# Patient Record
Sex: MALE | Race: OTHER | HISPANIC OR LATINO | URBAN - METROPOLITAN AREA
[De-identification: names, ages, dates, MRNs, and addresses within clinical notes are randomized per-mention and may not be internally consistent; named-entity substitution may affect disease eponyms.]

---

## 2018-08-29 PROBLEM — E66.01 SEVERE OBESITY (BMI 35.0-39.9): Status: ACTIVE | Noted: 2018-08-29

## 2019-04-11 PROBLEM — Z71.3 DIETARY COUNSELING: Status: ACTIVE | Noted: 2019-04-11

## 2019-04-11 PROBLEM — Z22.7 LATENT TUBERCULOSIS INFECTION: Status: ACTIVE | Noted: 2019-04-11

## 2019-04-11 PROBLEM — G47.33 OBSTRUCTIVE SLEEP APNEA SYNDROME: Status: ACTIVE | Noted: 2019-04-11

## 2019-04-11 PROBLEM — E78.5 HYPERLIPIDEMIA: Status: ACTIVE | Noted: 2019-04-11

## 2019-04-11 PROBLEM — R73.03 PREDIABETES: Status: ACTIVE | Noted: 2019-04-11

## 2019-05-07 PROBLEM — R73.9 ELEVATED BLOOD SUGAR: Status: ACTIVE | Noted: 2019-05-07

## 2019-05-07 PROBLEM — Z23 ENCOUNTER FOR IMMUNIZATION: Status: ACTIVE | Noted: 2019-05-07

## 2019-05-07 PROBLEM — Z00.00 PHYSICAL EXAM: Status: ACTIVE | Noted: 2019-05-07

## 2019-05-29 ENCOUNTER — HOSPITAL ENCOUNTER (EMERGENCY)
Age: 39
Discharge: HOME | End: 2019-05-29
Attending: EMERGENCY MEDICINE
Payer: SELF-PAY

## 2019-05-29 ENCOUNTER — APPOINTMENT (OUTPATIENT)
Dept: GENERAL RADIOLOGY | Age: 39
End: 2019-05-29
Attending: EMERGENCY MEDICINE
Payer: SELF-PAY

## 2019-05-29 VITALS
BODY MASS INDEX: 41.57 KG/M2 | HEART RATE: 77 BPM | SYSTOLIC BLOOD PRESSURE: 120 MMHG | DIASTOLIC BLOOD PRESSURE: 73 MMHG | OXYGEN SATURATION: 95 % | HEIGHT: 70 IN | WEIGHT: 290.4 LBS | RESPIRATION RATE: 18 BRPM | TEMPERATURE: 97.9 F

## 2019-05-29 DIAGNOSIS — S39.012A BACK STRAIN, INITIAL ENCOUNTER: ICD-10-CM

## 2019-05-29 DIAGNOSIS — R06.00 DYSPNEA, UNSPECIFIED TYPE: Primary | ICD-10-CM

## 2019-05-29 PROCEDURE — 77030013140 HC MSK NEB VYRM -A

## 2019-05-29 PROCEDURE — 74011636637 HC RX REV CODE- 636/637: Performed by: EMERGENCY MEDICINE

## 2019-05-29 PROCEDURE — 94640 AIRWAY INHALATION TREATMENT: CPT

## 2019-05-29 PROCEDURE — 71046 X-RAY EXAM CHEST 2 VIEWS: CPT

## 2019-05-29 PROCEDURE — 99283 EMERGENCY DEPT VISIT LOW MDM: CPT | Performed by: EMERGENCY MEDICINE

## 2019-05-29 PROCEDURE — 74011000250 HC RX REV CODE- 250: Performed by: EMERGENCY MEDICINE

## 2019-05-29 PROCEDURE — 94664 DEMO&/EVAL PT USE INHALER: CPT

## 2019-05-29 RX ORDER — PREDNISONE 20 MG/1
40 TABLET ORAL DAILY
Qty: 10 TAB | Refills: 0 | Status: SHIPPED | OUTPATIENT
Start: 2019-05-29 | End: 2019-06-03

## 2019-05-29 RX ORDER — IPRATROPIUM BROMIDE AND ALBUTEROL SULFATE 2.5; .5 MG/3ML; MG/3ML
3 SOLUTION RESPIRATORY (INHALATION)
Status: COMPLETED | OUTPATIENT
Start: 2019-05-29 | End: 2019-05-29

## 2019-05-29 RX ORDER — ALBUTEROL SULFATE 90 UG/1
2 AEROSOL, METERED RESPIRATORY (INHALATION)
Qty: 1 INHALER | Refills: 0 | Status: SHIPPED | OUTPATIENT
Start: 2019-05-29

## 2019-05-29 RX ORDER — PREDNISONE 20 MG/1
60 TABLET ORAL
Status: COMPLETED | OUTPATIENT
Start: 2019-05-29 | End: 2019-05-29

## 2019-05-29 RX ORDER — METHOCARBAMOL 750 MG/1
1500 TABLET, FILM COATED ORAL 4 TIMES DAILY
Qty: 40 TAB | Refills: 0 | Status: SHIPPED | OUTPATIENT
Start: 2019-05-29

## 2019-05-29 RX ADMIN — PREDNISONE 60 MG: 20 TABLET ORAL at 06:42

## 2019-05-29 RX ADMIN — IPRATROPIUM BROMIDE AND ALBUTEROL SULFATE 3 ML: .5; 3 SOLUTION RESPIRATORY (INHALATION) at 06:24

## 2019-05-29 NOTE — ED PROVIDER NOTES
28-year-old male presents with an episode of dyspnea. States it woke him from sleep. Previous smoker who quit about 3 months ago  He denies any recent fever  History of wheezing or shortness of breath    No ill contacts. No recent illnesses    The history is provided by the patient. Shortness of Breath   This is a new problem. The problem occurs rarely. The current episode started 1 to 2 hours ago. The problem has not changed since onset. Associated symptoms include wheezing. Pertinent negatives include no fever, no headaches, no rhinorrhea, no sore throat, no neck pain, no cough, no sputum production, no chest pain, no vomiting, no abdominal pain, no rash and no leg swelling. It is unknown what precipitated the problem. Risk factors include smoking. He has tried nothing for the symptoms. He has had no prior hospitalizations. Associated medical issues do not include asthma, COPD or pneumonia. No past medical history on file. No past surgical history on file. No family history on file.     Social History     Socioeconomic History    Marital status:      Spouse name: Not on file    Number of children: Not on file    Years of education: Not on file    Highest education level: Not on file   Occupational History    Not on file   Social Needs    Financial resource strain: Not on file    Food insecurity:     Worry: Not on file     Inability: Not on file    Transportation needs:     Medical: Not on file     Non-medical: Not on file   Tobacco Use    Smoking status: Not on file   Substance and Sexual Activity    Alcohol use: Not on file    Drug use: Not on file    Sexual activity: Not on file   Lifestyle    Physical activity:     Days per week: Not on file     Minutes per session: Not on file    Stress: Not on file   Relationships    Social connections:     Talks on phone: Not on file     Gets together: Not on file     Attends Episcopal service: Not on file     Active member of club or organization: Not on file     Attends meetings of clubs or organizations: Not on file     Relationship status: Not on file    Intimate partner violence:     Fear of current or ex partner: Not on file     Emotionally abused: Not on file     Physically abused: Not on file     Forced sexual activity: Not on file   Other Topics Concern    Not on file   Social History Narrative    Not on file         ALLERGIES: Patient has no known allergies. Review of Systems   Constitutional: Negative for activity change, chills, diaphoresis and fever. HENT: Negative for dental problem, hearing loss, nosebleeds, rhinorrhea and sore throat. Eyes: Negative for pain, discharge, redness and visual disturbance. Respiratory: Positive for shortness of breath and wheezing. Negative for cough, sputum production and chest tightness. Cardiovascular: Negative for chest pain, palpitations and leg swelling. Gastrointestinal: Negative for abdominal pain, constipation, diarrhea, nausea and vomiting. Endocrine: Negative for cold intolerance, heat intolerance, polydipsia and polyuria. Genitourinary: Negative for dysuria and flank pain. Musculoskeletal: Negative for arthralgias, back pain, joint swelling, myalgias and neck pain. Skin: Negative for pallor and rash. Allergic/Immunologic: Negative for environmental allergies and food allergies. Neurological: Negative for dizziness, tremors, light-headedness, numbness and headaches. Hematological: Negative for adenopathy. Does not bruise/bleed easily. Psychiatric/Behavioral: Negative for confusion and dysphoric mood. The patient is not nervous/anxious and is not hyperactive. All other systems reviewed and are negative. Vitals:    05/29/19 0612   BP: (!) 149/98   Pulse: 74   Resp: 20   Temp: (!) 76 °F (24.4 °C)   SpO2: 93%   Weight: 131.7 kg (290 lb 6.4 oz)   Height: 5' 10\" (1.778 m)            Physical Exam   Constitutional: He is oriented to person, place, and time. He appears well-developed and well-nourished. He appears distressed. HENT:   Head: Normocephalic and atraumatic. Right Ear: External ear normal.   Left Ear: External ear normal.   Mouth/Throat: Oropharynx is clear and moist. No oropharyngeal exudate. Eyes: Pupils are equal, round, and reactive to light. Conjunctivae and EOM are normal. No scleral icterus. Neck: Normal range of motion. Neck supple. No JVD present. No thyromegaly present. Cardiovascular: Normal rate, regular rhythm, normal heart sounds and intact distal pulses. Exam reveals no gallop and no friction rub. No murmur heard. Pulmonary/Chest: Effort normal. No respiratory distress. He has decreased breath sounds. He has no wheezes. He has no rales. Abdominal: Soft. Bowel sounds are normal. He exhibits no distension. There is no hepatosplenomegaly. There is no tenderness. Musculoskeletal: Normal range of motion. He exhibits no edema, tenderness or deformity. Back:    Neurological: He is alert and oriented to person, place, and time. No cranial nerve deficit or sensory deficit. He exhibits normal muscle tone. Coordination normal.   Skin: Skin is warm and dry. Capillary refill takes less than 2 seconds. No rash noted. Psychiatric: He has a normal mood and affect. His behavior is normal. Judgment and thought content normal.   Nursing note and vitals reviewed.        MDM  Number of Diagnoses or Management Options  Back strain, initial encounter: new and does not require workup  Dyspnea, unspecified type: new and requires workup  Diagnosis management comments: Chest x-ray reveals no acute cardiopulmonary disease  The patient is resting comfortably with a room air saturation of 95%    Patient will be discharged       Amount and/or Complexity of Data Reviewed  Tests in the radiology section of CPT®: ordered and reviewed  Tests in the medicine section of CPT®: ordered and reviewed  Review and summarize past medical records: yes    Risk of Complications, Morbidity, and/or Mortality  Presenting problems: moderate  Diagnostic procedures: moderate  Management options: moderate  General comments: Elements of this note have been dictated via voice recognition software. Text and phrases may be limited by the accuracy of the software. The chart has been reviewed, but errors may still be present.       Patient Progress  Patient progress: improved         Procedures

## 2019-05-29 NOTE — ED NOTES
I have reviewed discharge instructions with the patient. The patient verbalized understanding. Patient left ED via Discharge Method: ambulatory to Home with self. Opportunity for questions and clarification provided. Patient given 3 scripts. Darrin Rivas RN    To continue your aftercare when you leave the hospital, you may receive an automated call from our care team to check in on how you are doing. This is a free service and part of our promise to provide the best care and service to meet your aftercare needs.  If you have questions, or wish to unsubscribe from this service please call [phone]. Thank you for Choosing our [facility] Emergency Department.

## 2019-05-29 NOTE — DISCHARGE INSTRUCTIONS
Take medications as prescribed  Call your doctor/follow up doctor to set up appointment for recheck visit  Return to ER for any worsening symptoms or new problems which may arise

## 2019-07-30 ENCOUNTER — HOSPITAL ENCOUNTER (EMERGENCY)
Age: 39
Discharge: HOME | End: 2019-07-30
Attending: EMERGENCY MEDICINE
Payer: SELF-PAY

## 2019-07-30 VITALS
RESPIRATION RATE: 16 BRPM | WEIGHT: 294 LBS | OXYGEN SATURATION: 99 % | SYSTOLIC BLOOD PRESSURE: 135 MMHG | DIASTOLIC BLOOD PRESSURE: 80 MMHG | HEART RATE: 80 BPM | HEIGHT: 71 IN | TEMPERATURE: 98 F | BODY MASS INDEX: 41.16 KG/M2

## 2019-07-30 DIAGNOSIS — M54.6 ACUTE LEFT-SIDED THORACIC BACK PAIN: Primary | ICD-10-CM

## 2019-07-30 PROCEDURE — 99283 EMERGENCY DEPT VISIT LOW MDM: CPT | Performed by: EMERGENCY MEDICINE

## 2019-07-30 RX ORDER — DICLOFENAC POTASSIUM 50 MG/1
50 TABLET, FILM COATED ORAL 3 TIMES DAILY
Qty: 15 TAB | Refills: 0 | Status: SHIPPED | OUTPATIENT
Start: 2019-07-30

## 2019-07-30 RX ORDER — CYCLOBENZAPRINE HCL 5 MG
10 TABLET ORAL
Qty: 20 TAB | Refills: 0 | Status: SHIPPED | OUTPATIENT
Start: 2019-07-30

## 2019-07-31 NOTE — ED PROVIDER NOTES
Patient with left thoracic back pain. Worse with bending and twisting. 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 No saddle anesthesia or change in bowel or bladder function. No previous similar episodes. The history is provided by the patient. A  was used. Back Pain    This is a new problem. The current episode started more than 2 days ago. The problem has not changed since onset. The problem occurs constantly. Patient reports work related injury. 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 The pain is present in the thoracic spine and left side. The quality of the pain is described as aching and sharp. The pain does not radiate. The pain is moderate. The symptoms are aggravated by bending and twisting. Pertinent negatives include no chest pain, no fever, no numbness, no headaches, no abdominal pain, no abdominal swelling, no bowel incontinence, no perianal numbness, no bladder incontinence, no dysuria, no leg pain, no paresthesias and no weakness. The treatment provided no relief. No past medical history on file. Past Surgical History:   Procedure Laterality Date    HX ADENOIDECTOMY Bilateral     age - baby    HX TONSILLECTOMY Bilateral     age - baby         No family history on file. Social History     Socioeconomic History    Marital status:      Spouse name: Not on file    Number of children: Not on file    Years of education: Not on file    Highest education level: Not on file   Occupational History    Not on file   Social Needs    Financial resource strain: Not on file    Food insecurity:     Worry: Not on file     Inability: Not on file    Transportation needs:     Medical: Not on file     Non-medical: Not on file   Tobacco Use    Smoking status: Current Every Day Smoker     Packs/day: 0.25    Smokeless tobacco: Never Used    Tobacco comment: would like treatment -    Substance and Sexual Activity    Alcohol use:  Yes Comment: occasional    Drug use: No    Sexual activity: Yes     Partners: Female   Lifestyle    Physical activity:     Days per week: Not on file     Minutes per session: Not on file    Stress: Not on file   Relationships    Social connections:     Talks on phone: Not on file     Gets together: Not on file     Attends Voodoo service: Not on file     Active member of club or organization: Not on file     Attends meetings of clubs or organizations: Not on file     Relationship status: Not on file    Intimate partner violence:     Fear of current or ex partner: Not on file     Emotionally abused: Not on file     Physically abused: Not on file     Forced sexual activity: Not on file   Other Topics Concern    Not on file   Social History Narrative    Not on file         ALLERGIES: Aspirin    Review of Systems   Constitutional: Negative for chills and fever. Eyes: Negative for pain and redness. Respiratory: Negative for chest tightness, shortness of breath and wheezing. Cardiovascular: Negative for chest pain and leg swelling. Gastrointestinal: Negative for abdominal pain, bowel incontinence, diarrhea, nausea and vomiting. Genitourinary: Negative for bladder incontinence, dysuria and hematuria. Musculoskeletal: Positive for back pain. Negative for gait problem, neck pain and neck stiffness. Skin: Negative for color change and rash. Neurological: Negative for weakness, numbness, headaches and paresthesias. Vitals:    07/30/19 2222   BP: 139/83   Pulse: 84   Resp: 20   Temp: 98 °F (36.7 °C)   SpO2: 96%   Weight: 133.4 kg (294 lb)   Height: 5' 10.5\" (1.791 m)            Physical Exam   Constitutional: He is oriented to person, place, and time. He appears well-developed and well-nourished. HENT:   Head: Normocephalic and atraumatic. Cardiovascular: Normal rate and regular rhythm. Pulmonary/Chest: Effort normal and breath sounds normal.   Abdominal: Soft.  Bowel sounds are normal. There is no tenderness. Musculoskeletal: Normal range of motion. He exhibits tenderness (left mid back. no midline TTP. Youlanda Saris ). He exhibits no edema. Neurological: He is alert and oriented to person, place, and time. Skin: Skin is warm and dry. MDM  Number of Diagnoses or Management Options  Diagnosis management comments: We'll treat patient's back pain as muscular with medication at home.     Patient Progress  Patient progress: stable         Procedures

## 2019-07-31 NOTE — ED NOTES
I have reviewed discharge instructions with the patient. The patient verbalized understanding. Patient left ED via Discharge Method: ambulatory to Home with self. Opportunity for questions and clarification provided. Patient given 2 scripts. To continue your aftercare when you leave the hospital, you may receive an automated call from our care team to check in on how you are doing. This is a free service and part of our promise to provide the best care and service to meet your aftercare needs.  If you have questions, or wish to unsubscribe from this service please call [phone]. Thank you for Choosing our [facility] Emergency Department.

## 2019-07-31 NOTE — DISCHARGE INSTRUCTIONS
Patient Education        Dolor de espalda: Instrucciones de cuidado - [ Back Pain: Care Instructions ]  Instrucciones de cuidado    El dolor de espalda tiene muchas causas posibles. Con frecuencia, está relacionado con problemas en los músculos y ligamentos de la espalda. También podría estar relacionado con problemas de los nervios, discos o huesos de la espalda. Moverse, levantar algo, ponerse de pie, sentarse o dormir de [place] incómoda pueden forzar la espalda. Algunas veces no se da cuenta de que tiene david lesión [employer] tarde. La artritis es otra causa común del dolor de espalda. Aunque galo vez duela mucho, el dolor de espalda por lo general mejora por sí mismo en varias semanas. [address] personas se recuperan en 12 semanas o menos. Hacer un buen tratamiento en el hogar y tener cuidado de no forzar la espalda puede ayudar a que se sienta mejor más pronto. La atención de seguimiento es david parte clave de goss tratamiento y seguridad. Asegúrese de hacer y acudir a todas las citas, y llame a goss médico si está teniendo problemas. También es david buena idea saber los resultados de alison exámenes y mantener david lista de los medicamentos que riley. ¿Cómo puede cuidarse en el hogar? · Siéntese o acuéstese en las posiciones más cómodas y que reduzcan el dolor. Trate david de estas posiciones al [place]:  ? Acuéstese boca arriba con las rodillas dobladas y apoyadas sobre [address] [place]. ? Acuéstese en el piso con las piernas sobre el asiento de un sofá o david silla. ? Acuéstese de lado con las rodillas y caderas dobladas y [employer] almohada entre las piernas. ? Acuéstese boca abajo siempre que esta posición no empeore el dolor. · No se siente en la cama y evite los sofás blandos y las posiciones torcidas. El reposo en cama puede aliviar el dolor al principio, pérez retrasa la sanación. Evite reposar en la cama después del primer día de dolor de espalda. · Cambie de posición cada 30 minutos.  Si debe sentarse por [employer], párese y descanse de estar sentado. Levántese y camine, o acuéstese en david posición cómoda. · Pruebe usar david almohadilla térmica a temperatura baja o mediana rudi 15 a 20 minutos cada 2 ó 3 horas. Pruebe david ducha tibia en vez de david sesión con la almohadilla térmica. · También puede probar david compresa de hielo rudi 10 a 15 minutos cada 2 a 3 horas. Póngase un paño dai entre la compresa de hielo y la piel. · [employer] analgésicos (medicamentos para el dolor) exactamente según las indicaciones. ? Si el médico le recetó un analgésico, tómelo según las indicaciones. ? Si no está tomando un analgésico recetado, pregúntele a goss médico si puede cherry anuja de [employer]. · Luis F caminatas cortas varias veces al día. Usted puede comenzar con 5 a 10 minutos, 3 ó 4 veces al día, y aumentar progresivamente hasta lograr caminatas más largas. Camine en superficies megan y evite colinas y escaleras hasta que la espalda esté mejor. · Regrese al [place] y otras actividades lo más pronto posible. El descanso continuo sin actividad por lo general no es murry para la espalda. · Para prevenir el dolor de espalda en el futuro, luis f ejercicios para estirar y fortalecer la espalda y el abdomen. Aprenda a [place], técnicas seguras para levantar peso y la mecánica corporal apropiada. ¿Cuándo debe pedir ayuda? Llame a goss médico ahora mismo o busque atención médica inmediata si:    · Tiene un entumecimiento nuevo o peor en las piernas.     · Tiene debilidad nueva o peor en las piernas. (Preemption puede hacer que le resulte difícil ponerse de pie).   · Pierde el control de la vejiga o los intestinos.    Preste especial atención a los cambios en goss caitlin y asegúrese de comunicarse con goss médico si:    · Tiene fiebre, pierde peso o no se siente blaire.     · No mejora jaime se esperaba. ¿Dónde puede encontrar más información en inglés?   Zita Skinner a http://fadumo-monico.info/. Rianna Walker [id number] en la búsqueda para aprender David Plater de \"Dolor de espalda: Instrucciones de cuidado - [ Back Pain: Care Instructions ]. \"  Revisado: 20 septiembre, 2018  Versión del contenido: 12.1  © 1447-4524 Healthwise, Incorporated. Las instrucciones de cuidado fueron adaptadas bajo licencia por Good Help Connections (which disclaims liability or warranty for this information). Si usted tiene [place] afección médica o sobre estas instrucciones, siempre pregunte a goss profesional de caitlin. Healthwise, Incorporated niega toda garantía o responsabilidad por goss uso de esta información. Patient Education        Guillermo Martini del dolor de espalda - [ Delores Jose Maria About Relief for Back Pain ]  ¿Qué son la tensión y la distensión en la espalda? La distensión en la espalda ocurre cuando usted estira [place], o fuerza, un músculo de la espalda. Usted puede lesionarse la espalda en un accidente o cuando hace ejercicio o levanta algo. La mayoría de los betina de espalda mejoran con reposo y con el tiempo. Usted puede cuidarse en el hogar para ayudar a que goss espalda sane. ¿Qué es lo josué que puede hacer para aliviar el dolor de espalda? Cuando empiece a sentir dolor de espalda, siga estos pasos:  · Camine. Dé un paseo corto (10 a 20 minutos) sobre david superficie plana (sin pendientes, donde no haya colinas o escaleras) cada 2 o 3 horas. Solo camine distancias que pueda manejar sin dolor, especialmente dolor en las piernas. · Relájese. Encuentre david posición cómoda para descansar. Algunas personas se sienten cómodas en el suelo o en david cama de firmeza media con david almohada pequeña debajo de la osman y otra debajo de las rodillas. Otras prefieren acostarse de lado con david almohada entre las rodillas. No permanezca en david posición por [employer]. · Pruebe con calor o hielo.  Trate de utilizar david almohadilla térmica a baja o media temperatura, o tome david ducha tibia de 15 o 20 minutos cada 2 o 3 horas. O puede comprar vendas térmicas desechables que se usan david lucila vez por hasta 8 horas. También puede probar compresas de hielo entre 10 y 15 minutos cada 2 o 3 horas. Puede usar david compresa de hielo o david bolsa de verduras congeladas envuelta en david toalla delgada. No existe evidencia sólida de que el calor o el hielo ayuden, pérez puede probarlos para fani si son de [facility]. También podría convenirle probar alternar [employer] frío y el calor. · [employer] analgésicos (medicamentos para el dolor) exactamente según las indicaciones. ? Si el médico le recetó un analgésico, tómelo según las indicaciones. ? Si no está tomando un analgésico recetado, pregúntele a goss médico si puede cherry anuja de [employer]. ¿Qué más puede hacer? · Maryelizabeth Luster estiramientos y ejercicio. Los ejercicios que incrementan la flexibilidad pueden aliviar el dolor y facilitar que los músculos mantengan a la columna vertebral en david buena posición neutra. Y no se olvide de seguir caminando. · Hágase masajes usted mismo. Usted puede darse masaje para relajarse después del trabajo o de la escuela o para sentirse lleno de energía en la mañana. No es difícil [employer], las skip o el candida. El darse masaje usted mismo funciona mejor si está con ropa cómoda y sentado o [place] en david posición cómoda. Utilice aceite o loción para masajearse la piel directamente. · Reduzca el estrés. El dolor de espalda puede llevar a un círculo corey: La angustia por el dolor tensa los músculos en la espalda, lo que a goss vez causa más dolor. Aprenda a relajar la mente y los músculos para disminuir el estrés. ¿Dónde puede encontrar más información en inglés? López Denise a http://fadumo-monico.info/. Denece Hitch [id number] en la búsqueda para aprender más acerca de \"Aprenda sobre el [place] del dolor de espalda - [ Learning About Relief for Back Pain ]. \"  Revisado: 20 septiembre, 2018  Versión del contenido: 12.1  © 8609-2936 Healthwise, Incorporated. Las instrucciones de cuidado fueron adaptadas bajo licencia por Good Help Connections (which disclaims liability or warranty for this information). Si usted tiene [place] afección médica o sobre estas instrucciones, siempre pregunte a goss profesional de caitlin. Healthwise, Incorporated niega toda garantía o responsabilidad por goss uso de esta información.

## 2019-07-31 NOTE — ED TRIAGE NOTES
C/o mid to lower back pain for 3 days. States that he bent over at work and felt a \"catch\" in his back and has progressively gotten worse. Has been taking Tylenol for pain.   Denies pain with urination

## 2020-07-31 ENCOUNTER — APPOINTMENT (OUTPATIENT)
Dept: GENERAL RADIOLOGY | Age: 40
End: 2020-07-31
Attending: EMERGENCY MEDICINE

## 2020-07-31 ENCOUNTER — HOSPITAL ENCOUNTER (EMERGENCY)
Age: 40
Discharge: HOME | End: 2020-07-31
Attending: EMERGENCY MEDICINE

## 2020-07-31 VITALS
SYSTOLIC BLOOD PRESSURE: 131 MMHG | TEMPERATURE: 98.1 F | DIASTOLIC BLOOD PRESSURE: 79 MMHG | HEART RATE: 84 BPM | RESPIRATION RATE: 16 BRPM | OXYGEN SATURATION: 99 %

## 2020-07-31 DIAGNOSIS — F41.1 ANXIETY STATE: Primary | ICD-10-CM

## 2020-07-31 LAB
ALBUMIN SERPL-MCNC: 3.8 G/DL (ref 3.5–5)
ALBUMIN/GLOB SERPL: 1 {RATIO} (ref 1.2–3.5)
ALP SERPL-CCNC: 91 U/L (ref 50–136)
ALT SERPL-CCNC: 72 U/L (ref 12–65)
ANION GAP SERPL CALC-SCNC: 5 MMOL/L (ref 7–16)
AST SERPL-CCNC: 44 U/L (ref 15–37)
BASOPHILS # BLD: 0.1 K/UL (ref 0–0.2)
BASOPHILS NFR BLD: 1 % (ref 0–2)
BILIRUB SERPL-MCNC: 0.4 MG/DL (ref 0.2–1.1)
BNP SERPL-MCNC: 20 PG/ML (ref 5–125)
BUN SERPL-MCNC: 14 MG/DL (ref 6–23)
CALCIUM SERPL-MCNC: 8.9 MG/DL (ref 8.3–10.4)
CHLORIDE SERPL-SCNC: 107 MMOL/L (ref 98–107)
CO2 SERPL-SCNC: 27 MMOL/L (ref 21–32)
CREAT SERPL-MCNC: 1.26 MG/DL (ref 0.8–1.5)
DIFFERENTIAL METHOD BLD: NORMAL
EOSINOPHIL # BLD: 0.3 K/UL (ref 0–0.8)
EOSINOPHIL NFR BLD: 3 % (ref 0.5–7.8)
ERYTHROCYTE [DISTWIDTH] IN BLOOD BY AUTOMATED COUNT: 13 % (ref 11.9–14.6)
GLOBULIN SER CALC-MCNC: 3.8 G/DL (ref 2.3–3.5)
GLUCOSE SERPL-MCNC: 109 MG/DL (ref 65–100)
HCT VFR BLD AUTO: 42.5 % (ref 41.1–50.3)
HGB BLD-MCNC: 14.5 G/DL (ref 13.6–17.2)
IMM GRANULOCYTES # BLD AUTO: 0 K/UL (ref 0–0.5)
IMM GRANULOCYTES NFR BLD AUTO: 0 % (ref 0–5)
LYMPHOCYTES # BLD: 2.8 K/UL (ref 0.5–4.6)
LYMPHOCYTES NFR BLD: 28 % (ref 13–44)
MCH RBC QN AUTO: 31.3 PG (ref 26.1–32.9)
MCHC RBC AUTO-ENTMCNC: 34.1 G/DL (ref 31.4–35)
MCV RBC AUTO: 91.8 FL (ref 79.6–97.8)
MONOCYTES # BLD: 0.9 K/UL (ref 0.1–1.3)
MONOCYTES NFR BLD: 9 % (ref 4–12)
NEUTS SEG # BLD: 5.7 K/UL (ref 1.7–8.2)
NEUTS SEG NFR BLD: 59 % (ref 43–78)
NRBC # BLD: 0 K/UL (ref 0–0.2)
PLATELET # BLD AUTO: 204 K/UL (ref 150–450)
PMV BLD AUTO: 9.8 FL (ref 9.4–12.3)
POTASSIUM SERPL-SCNC: 3.7 MMOL/L (ref 3.5–5.1)
PROT SERPL-MCNC: 7.6 G/DL (ref 6.3–8.2)
RBC # BLD AUTO: 4.63 M/UL (ref 4.23–5.6)
SODIUM SERPL-SCNC: 139 MMOL/L (ref 136–145)
TROPONIN-HIGH SENSITIVITY: 5.3 PG/ML (ref 0–14)
WBC # BLD AUTO: 9.7 K/UL (ref 4.3–11.1)

## 2020-07-31 PROCEDURE — 80053 COMPREHEN METABOLIC PANEL: CPT

## 2020-07-31 PROCEDURE — 84484 ASSAY OF TROPONIN QUANT: CPT

## 2020-07-31 PROCEDURE — 83880 ASSAY OF NATRIURETIC PEPTIDE: CPT

## 2020-07-31 PROCEDURE — 71045 X-RAY EXAM CHEST 1 VIEW: CPT

## 2020-07-31 PROCEDURE — 85025 COMPLETE CBC W/AUTO DIFF WBC: CPT

## 2020-07-31 PROCEDURE — 99283 EMERGENCY DEPT VISIT LOW MDM: CPT

## 2020-07-31 PROCEDURE — 93005 ELECTROCARDIOGRAM TRACING: CPT | Performed by: EMERGENCY MEDICINE

## 2020-07-31 RX ORDER — HYDROXYZINE PAMOATE 25 MG/1
25 CAPSULE ORAL
Qty: 25 CAP | Refills: 0 | Status: SHIPPED | OUTPATIENT
Start: 2020-07-31

## 2020-07-31 NOTE — DISCHARGE INSTRUCTIONS
Use the medication as prescribed. Follow-up with a primary care physician or return to the ER for any other acute concerns.

## 2020-07-31 NOTE — ED NOTES
I have reviewed discharge instructions with the patient. The patient verbalized understanding. Patient left ED via Discharge Method: ambulatory to Home with self. Opportunity for questions and clarification provided. Patient given 1 scripts. To continue your aftercare when you leave the hospital, you may receive an automated call from our care team to check in on how you are doing. This is a free service and part of our promise to provide the best care and service to meet your aftercare needs.  If you have questions, or wish to unsubscribe from this service please call [phone]. Thank you for Choosing our [facility] Emergency Department.

## 2020-07-31 NOTE — ED PROVIDER NOTES
Patient is a 44-year-old male who comes to the emergency department today complaining of shortness of breath. He he reports he has a history of anxiety. Yesterday felt a little short of breath much worse today. No fever. Mild nonproductive cough. No chest pain. No leg swelling. No recent illness or known exposure to COVID    The history is provided by the patient. The history is limited by a language barrier. A  was used. Shortness of Breath   This is a new problem. The current episode started yesterday. The problem has been rapidly worsening. Associated symptoms include cough. Pertinent negatives include no fever, no rhinorrhea, no sore throat, no neck pain, no sputum production, no wheezing, no chest pain, no syncope, no vomiting, no abdominal pain, no rash and no leg swelling. He has tried nothing for the symptoms. Associated medical issues do not include asthma, COPD, CAD, heart failure or past MI. Past Medical History:   Diagnosis Date    Psychiatric disorder     anxiety       Past Surgical History:   Procedure Laterality Date    HX ADENOIDECTOMY Bilateral     age - baby    HX TONSILLECTOMY Bilateral     age - baby         No family history on file.     Social History     Socioeconomic History    Marital status:      Spouse name: Not on file    Number of children: Not on file    Years of education: Not on file    Highest education level: Not on file   Occupational History    Not on file   Social Needs    Financial resource strain: Not on file    Food insecurity     Worry: Not on file     Inability: Not on file    Transportation needs     Medical: Not on file     Non-medical: Not on file   Tobacco Use    Smoking status: Current Every Day Smoker     Packs/day: 0.25    Smokeless tobacco: Never Used    Tobacco comment: would like treatment -    Substance and Sexual Activity    Alcohol use: Yes     Comment: occasional    Drug use: No    Sexual activity: Yes Partners: Female   Lifestyle    Physical activity     Days per week: Not on file     Minutes per session: Not on file    Stress: Not on file   Relationships    Social connections     Talks on phone: Not on file     Gets together: Not on file     Attends [employer]ical service: Not on file     Active member of club or organization: Not on file     Attends meetings of clubs or organizations: Not on file     Relationship status: Not on file    Intimate partner violence     Fear of current or ex partner: Not on file     Emotionally abused: Not on file     Physically abused: Not on file     Forced sexual activity: Not on file   Other Topics Concern    Not on file   Social History Narrative    Not on file         ALLERGIES: Aspirin    Review of Systems   Constitutional: Negative for chills, fatigue and fever. HENT: Negative for congestion, rhinorrhea and sore throat. Eyes: Negative for pain, discharge and visual disturbance. Respiratory: Positive for cough and shortness of breath. Negative for sputum production and wheezing. Cardiovascular: Negative for chest pain, palpitations, leg swelling and syncope. Gastrointestinal: Negative for abdominal pain, diarrhea, nausea and vomiting. Endocrine: Negative for polydipsia and polyuria. Genitourinary: Negative for dysuria, frequency and urgency. Musculoskeletal: Negative for back pain and neck pain. Skin: Negative for rash. Neurological: Negative for seizures, syncope and weakness. Hematological: Negative. Vitals:    07/31/20 1625   BP: 135/84   Pulse: 96   Resp: 16   Temp: 98.1 °F (36.7 °C)   SpO2: 95%            Physical Exam  Vitals signs and nursing note reviewed. Constitutional:       Appearance: Normal appearance. He is well-developed. HENT:      Head: Normocephalic and atraumatic. Nose: Nose normal.   Eyes:      Extraocular Movements: Extraocular movements intact.       Conjunctiva/sclera: Conjunctivae normal.      Pupils: Pupils are equal, round, and reactive to light. Neck:      Musculoskeletal: Normal range of motion and neck supple. Cardiovascular:      Rate and Rhythm: Normal rate and regular rhythm. Heart sounds: Normal heart sounds. Pulmonary:      Effort: Pulmonary effort is normal.      Breath sounds: Normal breath sounds. No wheezing, rhonchi or rales. Chest:      Chest wall: No tenderness or crepitus. Abdominal:      Palpations: Abdomen is soft. Tenderness: There is no abdominal tenderness. There is no guarding or rebound. Musculoskeletal: Normal range of motion. General: No tenderness. Right lower leg: No edema. Left lower leg: No edema. Lymphadenopathy:      Cervical: No cervical adenopathy. Skin:     General: Skin is warm and dry. Findings: No rash. Neurological:      General: No focal deficit present. Mental Status: He is alert and oriented to person, place, and time. GCS: GCS eye subscore is 4. GCS verbal subscore is 5. GCS motor subscore is 6. Cranial Nerves: No cranial nerve deficit. Sensory: No sensory deficit. Motor: Motor function is intact. Psychiatric:         Mood and Affect: Mood is anxious. MDM  Number of Diagnoses or Management Options  Diagnosis management comments: I wore appropriate PPE throughout this patient's ED visit. Vanessa Sullivan MD, 5:18 PM    EKG reviewed by me shows normal sinus rhythm rate of 88 with no acute ischemia    5:51 PM  Chest x-ray is clear  Basic labs are normal  High-sensitivity troponin and proBNP are also normal    Patient appears comfortable with clear lungs and satting 100% on room air    We discussed all the results with the patient and he does admit to a lot of increased anxiety and is asking for something to help with that. He was reassured with his negative work-up today. Voice dictation software was used during the making of this note.   This software is not perfect and grammatical and other typographical errors may be present. This note has been proofread, but may still contain errors.   Loretta Van MD; 7/31/2020 @5:52 PM   ===================================================================         Amount and/or Complexity of Data Reviewed  Clinical lab tests: ordered and reviewed  Tests in the radiology section of CPT®: ordered and reviewed  Independent visualization of images, tracings, or specimens: yes    Risk of Complications, Morbidity, and/or Mortality  Presenting problems: moderate  Diagnostic procedures: low  Management options: low    Patient Progress  Patient progress: improved         Procedures

## 2020-07-31 NOTE — ED TRIAGE NOTES
Patient advises that he has been having some shortness of breath over past 2 days, worse today. Patient also advises he has noticed that over past couple months he has had to sleep with about 3 pillows at night. Patient denies any swelling to lower extremities. Patient denies any pain, fever or cough. Patient with mask on during triage.  used.

## 2020-08-01 LAB
ATRIAL RATE: 88 BPM
CALCULATED P AXIS, ECG09: 45 DEGREES
CALCULATED R AXIS, ECG10: 55 DEGREES
CALCULATED T AXIS, ECG11: 14 DEGREES
DIAGNOSIS, 93000: NORMAL
P-R INTERVAL, ECG05: 146 MS
Q-T INTERVAL, ECG07: 334 MS
QRS DURATION, ECG06: 82 MS
QTC CALCULATION (BEZET), ECG08: 404 MS
VENTRICULAR RATE, ECG03: 88 BPM